# Patient Record
Sex: FEMALE | Race: WHITE | NOT HISPANIC OR LATINO | URBAN - METROPOLITAN AREA
[De-identification: names, ages, dates, MRNs, and addresses within clinical notes are randomized per-mention and may not be internally consistent; named-entity substitution may affect disease eponyms.]

---

## 2020-05-14 ENCOUNTER — OFFICE VISIT (OUTPATIENT)
Dept: URBAN - METROPOLITAN AREA CLINIC 11 | Facility: CLINIC | Age: 85
End: 2020-05-14
Payer: MEDICARE

## 2020-05-14 DIAGNOSIS — H43.813 VITREOUS DEGENERATION, BILATERAL: Primary | ICD-10-CM

## 2020-05-14 DIAGNOSIS — H25.13 AGE-RELATED NUCLEAR CATARACT, BILATERAL: ICD-10-CM

## 2020-05-14 PROCEDURE — 92134 CPTRZ OPH DX IMG PST SGM RTA: CPT | Performed by: OPHTHALMOLOGY

## 2020-05-14 PROCEDURE — 99204 OFFICE O/P NEW MOD 45 MIN: CPT | Performed by: OPHTHALMOLOGY

## 2020-05-14 ASSESSMENT — INTRAOCULAR PRESSURE
OD: 18
OS: 18

## 2020-05-14 NOTE — IMPRESSION/PLAN
Impression: Age-related nuclear cataract, bilateral: H25.13. OU. Plan: No retinal contraindication to CE IOL. Discussed with patient to have a consult at her leisure, as of now her cataracts are asymptomatic.

## 2021-05-05 ENCOUNTER — HOSPITAL ENCOUNTER (OUTPATIENT)
Dept: RADIOLOGY | Facility: MEDICAL CENTER | Age: 86
End: 2021-05-05
Payer: COMMERCIAL

## 2022-12-28 ENCOUNTER — OFFICE VISIT (OUTPATIENT)
Dept: URBAN - METROPOLITAN AREA CLINIC 44 | Facility: CLINIC | Age: 87
End: 2022-12-28
Payer: COMMERCIAL

## 2022-12-28 DIAGNOSIS — H43.813 VITREOUS DEGENERATION, BILATERAL: ICD-10-CM

## 2022-12-28 DIAGNOSIS — H04.123 TEAR FILM INSUFFICIENCY OF BILATERAL LACRIMAL GLANDS: Primary | ICD-10-CM

## 2022-12-28 DIAGNOSIS — H33.322 ROUND HOLE OF RETINA, LEFT EYE: ICD-10-CM

## 2022-12-28 DIAGNOSIS — H40.013 OPEN ANGLE WITH BORDERLINE FINDINGS, LOW RISK, BILATERAL: ICD-10-CM

## 2022-12-28 DIAGNOSIS — H35.3131 NONEXUDATIVE MACULAR DEGENERATION, EARLY DRY STAGE, BILATERAL: ICD-10-CM

## 2022-12-28 DIAGNOSIS — H25.13 AGE-RELATED NUCLEAR CATARACT, BILATERAL: ICD-10-CM

## 2022-12-28 PROCEDURE — 92134 CPTRZ OPH DX IMG PST SGM RTA: CPT | Performed by: OPTOMETRIST

## 2022-12-28 PROCEDURE — 99204 OFFICE O/P NEW MOD 45 MIN: CPT | Performed by: OPTOMETRIST

## 2022-12-28 ASSESSMENT — VISUAL ACUITY
OS: 20/40
OD: 20/40

## 2022-12-28 ASSESSMENT — KERATOMETRY
OD: 44.88
OS: 44.50

## 2022-12-28 ASSESSMENT — INTRAOCULAR PRESSURE
OS: 18
OD: 17

## 2022-12-28 NOTE — IMPRESSION/PLAN
Impression: Open angle with borderline findings, low risk, bilateral: H40.013. Plan: Cupping OS>OD
IOP: 17/18 Did not start gtts today. Discussed MIGS.   See Marbella Jenkins again prior to cat sx preop for IOP + pachymetry + OCT RNFL + 24-2 HVF

## 2022-12-28 NOTE — IMPRESSION/PLAN
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 Plan: Old retinal hole attached s/p laser OS. RTC asap if notice symptoms of RD (reviewed with patient).

## 2022-12-28 NOTE — IMPRESSION/PLAN
Impression: Age-related nuclear cataract, bilateral: H25.13. Plan: Discussed cataracts with patient. Discussed treatment options. Surgical treatment is recommended. Surgical risks and benefits discussed. Patient elects surgical treatment. Recommend surgery OU, OS first. standard lens, LenSx, ORA. Aim OD: -2.25. Aim OS: -2.25. Patient will need glasses for distance work (if near aim). May also need glasses for near vision. Consider MIGS. Glaucoma surgeon recommended. See Sherryle Mylar again prior to cat sx preop for IOP + pachymetry + OCT RNFL + 24-2 HVF. Outcome of surgery limitations include:  Tear film insufficiency of bilateral lacrimal glands, Vitreous degeneration, bilateral, Nonexudative macular degeneration, early dry stage, bilateral, and Open angle with borderline findings, low risk, bilateral, retinal hole, left eye.

## 2023-01-13 ENCOUNTER — OFFICE VISIT (OUTPATIENT)
Dept: URBAN - METROPOLITAN AREA CLINIC 44 | Facility: CLINIC | Age: 88
End: 2023-01-13
Payer: COMMERCIAL

## 2023-01-13 DIAGNOSIS — H40.013 OPEN ANGLE WITH BORDERLINE FINDINGS, LOW RISK, BILATERAL: Primary | ICD-10-CM

## 2023-01-13 PROCEDURE — 99213 OFFICE O/P EST LOW 20 MIN: CPT | Performed by: OPTOMETRIST

## 2023-01-13 PROCEDURE — 92133 CPTRZD OPH DX IMG PST SGM ON: CPT | Performed by: OPTOMETRIST

## 2023-01-13 PROCEDURE — 76514 ECHO EXAM OF EYE THICKNESS: CPT | Performed by: OPTOMETRIST

## 2023-01-13 ASSESSMENT — INTRAOCULAR PRESSURE
OD: 18
OS: 18

## 2023-01-13 NOTE — IMPRESSION/PLAN
Impression: Open angle with borderline findings, low risk, bilateral: H40.013. Plan: Cupping OS>OD Pachymetry: 525/528 IOP: 18/18 OCT RNFL (01/13/23): abnormal OU Did not start gtts today. Unable to get VF due to patient being late to appointment. Schedule VF at preop. May need to start meds depending on how VF looks at preop.

## 2023-01-19 ENCOUNTER — OFFICE VISIT (OUTPATIENT)
Dept: URBAN - METROPOLITAN AREA CLINIC 44 | Facility: CLINIC | Age: 88
End: 2023-01-19
Payer: COMMERCIAL

## 2023-01-19 DIAGNOSIS — H25.13 AGE-RELATED NUCLEAR CATARACT, BILATERAL: Primary | ICD-10-CM

## 2023-01-19 DIAGNOSIS — Z01.818 ENCOUNTER FOR OTHER PREPROCEDURAL EXAMINATION: Primary | ICD-10-CM

## 2023-01-19 PROCEDURE — 99203 OFFICE O/P NEW LOW 30 MIN: CPT | Performed by: PHYSICIAN ASSISTANT

## 2023-01-19 PROCEDURE — 92083 EXTENDED VISUAL FIELD XM: CPT | Performed by: PHYSICIAN ASSISTANT

## 2023-01-19 RX ORDER — LOSARTAN POTASSIUM 100 MG/1
100 MG TABLET, FILM COATED ORAL
Qty: 0 | Refills: 0 | Status: ACTIVE
Start: 2023-01-19

## 2023-01-19 ASSESSMENT — PACHYMETRY
OS: 3.37
OD: 3.42
OS: 25.65
OD: 25.31

## 2023-02-06 ENCOUNTER — OFFICE VISIT (OUTPATIENT)
Dept: URBAN - METROPOLITAN AREA CLINIC 44 | Facility: CLINIC | Age: 88
End: 2023-02-06
Payer: MEDICARE

## 2023-02-06 DIAGNOSIS — H33.322 ROUND HOLE OF RETINA, LEFT EYE: Primary | ICD-10-CM

## 2023-02-06 DIAGNOSIS — H43.813 VITREOUS DEGENERATION, BILATERAL: ICD-10-CM

## 2023-02-06 DIAGNOSIS — H25.13 AGE-RELATED NUCLEAR CATARACT, BILATERAL: ICD-10-CM

## 2023-02-06 PROCEDURE — 99204 OFFICE O/P NEW MOD 45 MIN: CPT | Performed by: OPHTHALMOLOGY

## 2023-02-06 ASSESSMENT — INTRAOCULAR PRESSURE
OD: 17
OS: 15

## 2023-02-06 NOTE — IMPRESSION/PLAN
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 Plan: ST hole s/p laser in the past, no tears or breaks.  ok to proceed with cataract surgery

## 2023-02-06 NOTE — IMPRESSION/PLAN
Follow up regarding this sleep study will be coordinated through my practice Impression: Age-related nuclear cataract, bilateral: H25.13. Plan: visually significant cataracts, no retinal holes, breaks, tears, or detachments. ok to proceed with cataract surgery RTC Cataract surgery